# Patient Record
Sex: MALE | Race: WHITE | NOT HISPANIC OR LATINO | Employment: FULL TIME | URBAN - METROPOLITAN AREA
[De-identification: names, ages, dates, MRNs, and addresses within clinical notes are randomized per-mention and may not be internally consistent; named-entity substitution may affect disease eponyms.]

---

## 2017-01-12 ENCOUNTER — HOSPITAL ENCOUNTER (OUTPATIENT)
Dept: BEHAVIORAL HEALTH | Facility: CLINIC | Age: 52
Discharge: HOME | End: 2017-01-12
Attending: SOCIAL WORKER | Admitting: SOCIAL WORKER
Payer: COMMERCIAL

## 2017-01-12 PROCEDURE — H0001 ALCOHOL AND/OR DRUG ASSESS: HCPCS

## 2017-01-12 ASSESSMENT — PATIENT HEALTH QUESTIONNAIRE - PHQ9: 5. POOR APPETITE OR OVEREATING: NOT AT ALL

## 2017-01-12 ASSESSMENT — ANXIETY QUESTIONNAIRES
5. BEING SO RESTLESS THAT IT IS HARD TO SIT STILL: NOT AT ALL
GAD7 TOTAL SCORE: 0
1. FEELING NERVOUS, ANXIOUS, OR ON EDGE: NOT AT ALL
IF YOU CHECKED OFF ANY PROBLEMS ON THIS QUESTIONNAIRE, HOW DIFFICULT HAVE THESE PROBLEMS MADE IT FOR YOU TO DO YOUR WORK, TAKE CARE OF THINGS AT HOME, OR GET ALONG WITH OTHER PEOPLE: NOT DIFFICULT AT ALL
6. BECOMING EASILY ANNOYED OR IRRITABLE: NOT AT ALL
3. WORRYING TOO MUCH ABOUT DIFFERENT THINGS: NOT AT ALL
7. FEELING AFRAID AS IF SOMETHING AWFUL MIGHT HAPPEN: NOT AT ALL
2. NOT BEING ABLE TO STOP OR CONTROL WORRYING: NOT AT ALL

## 2017-01-12 NOTE — PROGRESS NOTES
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 past or current     Secondary meeting, it is group and lecture, continuing and feed you lunch. Yes plan on going to meetings, supposedly going through IOP here. Get a hold of the elana to see about meetings. Interested in [employer].     5. What would assist you in staying sober/straight?     education      Dimension V Ratings    Relapse/Continued Use/Continued problem potential - The placing authority must use the criteria in Dimension V to determine a client s relapse, continued use, and continued problem potential.    RISK DESCRIPTIONS - Severity rating: 3 Client has poor recognition and understanding of relapse and recidivism issues and displays moderately high vulnerability for further substance use or mental health problems. Client has few coping skills and rarely applies coping skills.    REASONS SEVERITY WAS ASSIGNED - (What information did the person provide that indicates his or her understanding of relapse issues? What about the person s experience indicates how prone he or she is to relapse? What coping skills does the person have that decrease relapse potential?)      Clt reported he was a social drinker prior to this year. Clt reported he has never tried to quit. Per Pranav collateral clt would try to have one  "but would end up having more. Clt reported one treatment in his lifetime in which he completed. Clt reported cravings from triggers and stress.  Bebot reported no attendance to meetings yet but was just discharged from [facility] 1/11/17 and plans to start attending meetings.              DIMENSION VI - Recovery Environment    1. Are you employed/attending school? Tell me about that.     Per EMR collateral assessment on 12/6/16: \"Full time-injury , right now, the plan is to go back on Monday-Friday, I can work from home if I need to or work whatever hours I need to, Expect me to work about eighty hours a week\".     Going back to work next Wednesday. Hours 7-3:30pm. In all reality 7-9pm they would like you to work but I am not going to work that. Not healthy.     2A. Describe a typical day; evening for you. Work, school, social, leisure, volunteer, spiritual practices. Include time spent obtaining, using, recovering from drugs or alcohol. (DSM)     Works full time Monday-Friday days, drinking is his leisure activity.    2B. How often do you spend more time than you planned using or use more than you planned? (DSM)     Got to daily.     3. How important is using to your social connections? Do many of your family or friends use?     When we go out we all go out drinking.     4A. Are you currently in a significant relationship?     Yes.  4B. How long? 26 years    4C. Sexual Orientation:     Heterosexual    5A. Who do you live with?      Live with spouse    5B. Tell me about their alcohol/drug use and mental health issues.     Quit drinking.      5C. Are you concerned for your safety there? No    5D. Are you concerned about the safety of anyone else who lives with you? No    6A. Do you have children who live with you?     No    6B. Do you have children who do not live with you?     Yes.  (Ask follow up questions to learn where the children are, who has custody and what the person s relationship and " responsibility is with these children and what hopes the person has for his or her future with these children.) two daughters who live on their own both adults. Rosa Maria 21 and Jeri 25    7A. Who supports you in making changes in your alcohol or drug use? What are they willing to do to support you? Who is upset or angry about you making changes in your alcohol or drug use? How big a problem is this for you?      Spouse and a friend    7B. This table is provided to record information about the person s relationships and available support It is not necessary to ask each item; only to get a comprehensive picture of their support system.  How often can you count on the following people when you need someone?    Partner / Spouse  Always supportive    Parent(s)/Aunt(s)/Uncle(s)/Grandparents  N/A    Sibling(s)/Cousin(s)  N/A    Child(will)  N/A    Other relative(s)  N/A    Friend(s)/neighbor(s)  Always supportive    Child(will) s father(s)/mother(s)  N/A    Support group member(s)  N/A    Community of jesse members  N/A    /counselor/therapist/healer  N/A    Other (specify)  N/A      8A. What is your current living situation?     Live with spouse.     8B. What is your long term plan for where you will be living?     Within the next five years, hoping to downgrade, like to a town home.     8C. Tell me about your living environment/neighborhood? Ask enough follow up questions to determine safety, criminal activity, availability of alcohol and drugs, supportive or antagonistic to the person making changes.      Liquor store one mile away    9. Criminal justice history: Gather current/recent history and any significant history related to substance use--Arrests? Convictions? Circumstances? Alcohol or drug involvement? Sentences? Still on probation or parole? Expectations of the court? Current court order? Any sex offenses - lifetime? What level? (DSM)      Per Pranav CLEMENTI and was in skilled nursing and told his  wife that he needed to come to treatment.Stated he drank a few air plane bottles of vodka on his way to work and arrested for DUI.  [facility], did it on purpose really stupid to get help. 2016 court, already had a original appearance but next one is in April.  got it switched since I went to [facility].    10. What obstacles exist to participating in treatment? (Time off work, childcare, funding, transportation, pending [facility] time, living situation)     None      Dimension VI Ratings    Recovery environment - The placing authority must use the criteria in Dimension VI to determine a client s recovery environment.    RISK DESCRIPTIONS - Severity ratin Client has passive social  or family and significant other are not interested in the client s recovery. The client is engaged in structured meaningful activity.    REASONS SEVERITY WAS ASSIGNED - (What support does the person have for making changes? What structure/stability does the person have in his or her daily life that will increase the likelihood that changes can be sustained? What problems exist in the person s environment that will jeopardize getting/staying clean and sober?)     Liberty reported he works full time and is going back on 17.  Clt reported his social connections socially drink. Clt reported he lives with his wife who quit drinking to be supportive. Clt reported he has two adult daughters who live out of the house. Clt reported spouse and a friend are supportive of him. Clt reported current legal with upcoming court appearance in April.             Client Choice/Exceptions    Would you like services specific to language, age, gender, culture, Denominational preference, race, ethnicity, sexual orientation or disability?  No    What particular treatment choices and options would you like to have? IOP    Do you have a preference for a particular treatment program? NA      Criteria for Diagnosis      Criteria for  Diagnosis  DSM-5 Criteria for Substance Use Disorder  Instructions: Determine whether the client currently meets the criteria for Substance Use Disorder using the diagnostic criteria in the DSM-V pp.481-589. Current means during the most recent 12 months outside a facility that controls access to substances      Category of Substance  Severity (ICD-10 Code / DSM 5 Code)      Alcohol Use Disorder  Severe  (10.20) (303.90)    Cannabis Use Disorder  NA    Hallucinogen Use Disorder  NA    Inhalant Use Disorder  NA    Opioid Use Disorder  NA    Sedative, Hypnotic, or Anxiolytic Use Disorder  NA    Stimulant Related Disorder  NA    Tobacco Use Disorder  NA    Other (or unknown) Substance Use Disorder  NA          Collateral Contact Summary    Number of contacts made: 2    Contact with referring person:  Yes, Pranav documentation    If court related records were reviewed, summarize here: NA    Information from collateral contacts supported/largely agreed with information from the client and associated risk ratings.        Rule 25 Assessment Summary and Plan    's Recommendation    1. Abstain from using all non-prescribed mood altering chemicals and substances. Take medication as prescribed.  2. Attend [facility] such as [facility]. Follow all recommendations made by counselor.  3. Start attending sober support group meetings.  4. Establish therapy services if recommended by treatment counselor.     Rationale: Liberty reported within the last year his use had increased and started negatively impacting multiple areas of his life such as his physical and mental health, his relationships, and legal. Liberty reported he would start drinking at home, earlier and earlier, until he admitted to his wife he needed help and went to the hospital for detox. Liberty reported he attended residential treatment program and would like to follow up with IOP treatment to continue structured support and  "routine in regards to his sobriety. These are the reasons for the above recommendations.      discussed the potential need to establish therapy services if he follows through with EOP at the [place] location to address both mental health needs and chemical use since the counselor is not dually licensed she would work with him on establishing therapy services during treatment. Clt verbalized understanding and said ok to having a discussion around the topic.          Collateral Contacts        Name:    Electronic Medical Record (EMR)    Relationship:    Past cd evaluation done on 12/6/16    Phone Number:    NA Releases:    Yes      See throughout assessment documentation. When it states \"per collateral assessment or per EMR collateral assessment or per EMR\". Per EMR collateral assessment from 12/6/16 from wife: \" He used to be a social drinker for 30 years, then he hit a rough patch beginning of March of this year, and was on disability at home from approx. March to August. He started drinking during the day secretly. He is going to lose his job at [employer] because of layoffs and that has been looming for the past few years. He has been at [employer] for 20 years and is anxious about the future. Drinks alcohol not sure-I only saw him drink on Friday and Saturday nights and it was socially. He has had withdrawal of sweating, vomiting, shaking and he went into the hospital and got detox this past week. I am worried about the future for him. He needs to get back to work on a regular basis. He can't sit home during the day and feel sorry for himself. He has no self esteem when it comes to finding a new job he is stuck, scared, and doesn't know where to turn. He used to work out everyday. Examples, ran a half marathon, biked a 150 miles in a weekend and did a triathlon. He has completely quit working out and needs to begin again\".          Collateral Contacts        Name:    Scarlett Rock " "Blaine Relationship:    Inpatient treatment from 12/20/16-1/11/17    Phone Number:    [phone] Releases:    Yes        Received initial assessment from Pranav when the client was admitted to the program. See throughout documentation when states \"per Pranav collateral\".   ollateral Contacts      A problematic pattern of alcohol/drug use leading to clinically significant impairment or distress, as manifested by at least two of the following, occurring within a 12-month period:    Alcohol/drug is often taken in larger amounts or over a longer period than was intended.  There is a persistent desire or unsuccessful efforts to cut down or control alcohol/drug use  A great deal of time is spent in activities necessary to obtain alcohol, use alcohol, or recover from its effects.  Craving, or a strong desire or urge to use alcohol/drug  Continued alcohol use despite having persistent or recurrent social or interpersonal problems caused or exacerbated by the effects of alcohol/drug.  Alcohol/drug use is continued despite knowledge of having a persistent or recurrent physical or psychological problem that is likely to have been caused or exacerbated by alcohol.  Tolerance, as defined by either of the following: A need for markedly increased amounts of alcohol/drug to achieve intoxication or desired effect. and A markedly diminished effect with continued use of the same amount of alcohol/drug.  Withdrawal, as manifested by either of the following: The characteristic withdrawal syndrome for alcohol/drug (refer to Criteria A and B of the criteria set for alcohol/drug withdrawal). and Alcohol/drug (or a closely related substance, such as a benzodiazepine) is taken to relieve or avoid withdrawal symptoms.      Specify if: In early remission:  After full criteria for alcohol/drug use disorder were previously met, none of the criteria for alcohol/drug use disorder have been met for at least 3 months but for less than 12 " months (with the exception that Criterion A4,  Craving or a strong desire or urge to use alcohol/drug  may be met).      In sustained remission:    After full criteria for alcohol use disorder were previously met, non of the criteria for alcohol/drug use disorder have been met at any time during a period of 12 months or longer (with the exception that Criterion A4,  Craving or strong desire or urge to use alcohol/drug  may be met).    Specify if:    This additional specifier is used if the individual is in an environment where access to alcohol is restricted.    Mild: Presence of 2-3 symptoms    Moderate: Presence of 4-5 symptoms    Severe: Presence of 6 or more symptoms

## 2017-01-12 NOTE — PROGRESS NOTES
[address]  [place] [address]                 ADULT CD ASSESSMENT       Additional Clinical Questions - Outpatient    Patient Name:            Dariusz Galeana  Cell Phone:              Home: [phone] (home)                Mobile:  No relevant phone numbers on file.        Email:             [email or web address]  Emergency Contact: Jagruti Galeana                            Tel: [phone]    ________________________________________________________________________      The patient is      With which race do you identify? White    Please list your family members and if they are living or , i.e. (grandparents, parents, step-parents, adoptive parents, number of siblings, half-siblings, etc.)       Mother      Father  Living    1 Step-mother    Living  No Step-father  NA    Maternal Grandmother      Fraternal Grandmother      Maternal Grandfather       Fraternal Grandfather      No Sister(s)  NA  1 Brother(s)    Living    No Half-sister(s)    NA  No Half-brother(s)  NA                  Who raised you? (parents, grandparents, adoptive parents, step-parents, etc.)    Both Parents    Have any of your family members or significant others had problems with mental illness or substance abuse?  Please explain.    Yes brother, alcohol    Do you have any children or Stepchildren? Yes, please explain: Jeri 25 and Rosa Maria 21    Are you being investigated by Child Protection Services? No    Do you have a child protection worker, probation office or ? No    How would you describe your current finances?  Doing okay    If you are having problems, (unpaid bills, bankruptcy, IRS problems) please explain:  No    If working or a student are you able to function appropriately in that setting? Yes, returning to work on 17-we will see how it goes.     Describe your preferred learning style:  by  hands-on practice, by watching someone else demonstrate.    What personal strengths do you have that can help you get sober?  The tools I learned at [facility]. 12 Step and Big Book    Do you currently self-administer your medications?  Yes    Have you ever:      Had to lie to people important to you about how much you narayan?      No      Felt the need to bet more and more money?       No      Attempted treatment for a gambling problem?          No      Touched or fondled someone else inappropriately, or forced them to have sex with you against their will?         No      Are you or have you ever been a registered sex offender?          No      Is there any history of sexual abuse in your family?          No      [place] obsessed by your sexual behavior (having sex with many partners, masturbating often, using pornography often?          No      Received therapy or stayed in the hospital for mental health problems?          No      Hurt yourself (cutting, burning or hitting yourself)?          No      Purged, binged or restricted yourself as a way to control your weight?        No        Are you on a special diet?        No        Do you have any concerns regarding your nutritional status?         No        Have you had any appetite changes in the last 3 months?         Yes, started since sober.       Have you had any weight loss or weight gain in the last 3 months?  If yes, how much gain or loss: declined to answer.    If weight patient gains more than 10 lbs or loses more than 10 lbs, refer to program RN /  Attending Physician for assessment.     Gain, from eating 3x per day.      Was the patient informed of BMI?      Normal, No Intervention    No      Do you have any dental problems?          No      Lived through any trauma or stressful events?          Yes, If yes explain: lost brother, and several others, seen people commit suicide, work is overly stressful.      In the past month, have you had any of the  following symptoms related to the trauma listed above? (Dreams, intense memories, flashbacks, physical reactions, etc.)           No      Believed that people are spying on you, or that someone was plotting against you or trying to hurt you?        No      Believed that someone was reading your mind or could hear your thoughts or that you could actually read someone's mind, hear what another person was thinking?         No      Believed that someone or some force outside of yourself put thoughts in your mind that were not your own, or made you act in a way that was not your usual self?  Or have you ever thought you were possessed?           No      Believed that you were being sent special messages through the TV, radio or newspaper?           No      [place] things other people couldn't hear, such as voices?           No      Had visions when you were awake?  Or have you ever seen things other people couldn't see?         No          Suicide Screening Questions:      1. Are you feeling hopeless about the present/future?    No   2. Have you ever had thoughts about taking your life?    No    3. When did you have these thoughts?  NA    4. Do you have any current intent or active desire to take your life?    No    5. Do you have a plan to take your life?     No    6. Have you ever made a suicide attempt?    No    7. Do you have access to pills, guns or other methods to kill yourself?    No         Risk Status - Use as Guide/Example      Ideation - Active  Thoughts of suicide  Intent to follow  Through on suicide  Plan for completing  suicide     Yes  No  Yes  No  Yes  No    Emergent  X    X    X      Urgent / Non-Emergent  X    X      X    Non-Urgent  X      X    X    No Current / Active Risk (Past 6 Months)    X    X    X    Dariusz Galeana  No  No  No          Additional Risk Factors:  Significant history of having untreated or poorly treated mental health symptoms  A recent death of someone close to the patient and/or  "unresolved grief and loss issues  A triggering event(s) leading to humiliation, shame or despair    Protective Factors:   Having people in the his/her life who would prevent the patient from considering comminting suicide (i.e. young children, spouse, parents, etc.)  Having easy access to supportive family members  Having a good community support network        Risk Status:    Emergent? No  Urgent / Non-Emergent?  No  Present / Non- Urgent? No      No Current Risk? Yes, Evaluation Counselors - Document in Epic / SBAR to counselor \"No identified risk\" and Treatment Counselors - Assess weekly in progress notes under Dimension 3 and summarize in Discharge / Treatment summary under Dimension 3.    Additional information to support suicide risk rating: See Above    Mental Status Assessment    Physical Appearance/Attire:  Appears stated age and Neat  Hygiene:  well groomed  Eye Contact:  at examiner  Speech:  regular  Speech Volume:  regular  Speech Quality: fluid  Cognitive/Perceptual:  reality based  Cognition:  memory intact    Judgment:  able to concentrate  Insight:  able to concentrate  Orientation:  time, place, person and situation  Thought:  concrete  Hallucinations:  none  General Behavioral Tone:  cooperative  Psychomotor Activity:  no problem noted  Gait:  no problem  Mood:  appropriate  Affect:  congruence/appropriate    Counselor Notes: NA    Criteria for Diagnosis  DSM-5 Criteria for Substance Abuse    303.90 (F10.20) Alcohol Use Disorder Severe    LEVEL OF CARE     Intoxication and Withdrawal: 0  Biomedical:  1  Emotional and Behavioral:  1  Readiness to Change:  0  Relapse Potential: 3  Recovery Environmental:  1    Initial problem list:    The patient lacks relapse prevention skills  The patient has poor coping skills  The patient lacks a sober peer support network  The patient has dual issues of MI and CD  The patient has a significant history of trauma and/or abuse issues  The patient has a significant " history of grief and loss issues    Patient/Client is willing to follow treatment recommendations.  Yes    Reina [facility]           Vulnerable Adult Checklist for OUTPATIENTS      1.  Do you have a physical, emotional or mental infirmity or dysfunction?       No    2.  Does this issue impair your ability to provide for your own care without help, including providing yourself with food, shelter, clothing, healthcare or supervision?       No    3.  Because of this issue, I need assistance to protect myself from maltreatment by others.      No    Based on the above information:    This person is not a functional Vulnerable Adult according to [place] Statute 626.5572 subdivision 21.

## 2017-01-13 ASSESSMENT — ANXIETY QUESTIONNAIRES: GAD7 TOTAL SCORE: 0

## 2017-01-13 ASSESSMENT — PATIENT HEALTH QUESTIONNAIRE - PHQ9: SUM OF ALL RESPONSES TO PHQ QUESTIONS 1-9: 2

## 2017-01-18 ENCOUNTER — BEH TREATMENT PLAN (OUTPATIENT)
Dept: BEHAVIORAL HEALTH | Facility: CLINIC | Age: 52
End: 2017-01-18

## 2017-01-18 ENCOUNTER — HOSPITAL ENCOUNTER (OUTPATIENT)
Dept: BEHAVIORAL HEALTH | Facility: CLINIC | Age: 52
End: 2017-01-18
Attending: SOCIAL WORKER
Payer: COMMERCIAL

## 2017-01-18 PROBLEM — F19.20 CHEMICAL DEPENDENCY (H): Status: ACTIVE | Noted: 2017-01-18

## 2017-01-18 PROCEDURE — H2035 A/D TX PROGRAM, PER HOUR: HCPCS | Mod: HQ

## 2017-01-19 NOTE — PROGRESS NOTES
Outcome of vulnerable Adult Assessment for Outpatients:    1.  Do you have a physical, emotional or mental infirmity or dysfunction?       No    2.  Does this issue impair your ability to provide for your own care without help, including providing yourself with food, shelter, clothing, healthcare or supervision?       No      3.  Because of this issue, I need assistance to protect myself from maltreatment by others.      No    Based on the above information:    This person is not a functional Vulnerable Adult according to [place] Statute 626.5572 subdivision 21.

## 2017-01-19 NOTE — PROGRESS NOTES
"D:   Dariusz Galeana attended orientation on Wed, 1/18/2017, with this counselor. He was given the orientation packet which was reviewed in its entirety. This packet includes: Program philosophy, treatment goals, program schedules, educational components, family group sessions, how to use the treatment materials, program rules and policies, client rights/responsibilities, information on HIV, STDs, Hepatitis, TB, information on how substance use affects pregnancy, information on narcan, abuse prevention plan/reporting protocol, client grievance procedure, risks of treatment, confidentiality and exceptions, treatment agreement/consent, facility tour/safety information and information about co-occurring disorders. Client was also given information on insurance processing and the contact number for the business office should he have any questions about coverage and/or co-pays. He was introduced to the stages of change and identified himself at the preparation stage as he reports, \"I am working on continued care\". Client completed his goals for treatment to include in the treatment plan and learned about PAWS. Client reported last use date of alcohol was as 12/19/2016. He reviewed the risks of treatment and is aware that he may experience emotions and/or memories that may be uncomfortable. He agreed to the confidential nature of the treatment group and is open to the group process. We discussed why building a sober support network is important for sobriety.     I: Required paperwork was explained and signed, gave him tour of facility, showed him the group room, fire exits and location of restrooms, he collaborated on the ISP and the Vulnerable Adult assessment. We discussed personal goals for treatment and what to expect within a group therapy format. He completed his personal goals for treatment to review with his counselor at his treatment plan session scheduled for 5:30 pm. on Monday, 1/23/2017.    A: Client " acknowledged understanding of orientation material. He was alert and appeared motivated for sobriety and ready to start treatment.      P: Client will review all the packet information at home in case he has additional questions and then start IOP CD treatment at [facility] location at  6:30 pm. on Monday, 1/23/2017.      WOODROW Shea

## 2017-01-19 NOTE — PROGRESS NOTES
Initial Services Plan        Before your first treatment group, please do the following    Immediate health & safety concerns:  Look for a support network (such as AA, NA, DBT group, a Gnosticist group, etc.)    Suggestions for client during the time between intake & completion of treatment plan:  Tour your treatment center (unit or outpatient clinic).  Introduce yourself to the treatment group.  Spend time getting to know your peers.  Review your patient or client handbook.    Client issues to be addressed in the first treatment sessions:  Talk to your family and friends about the family program.  Other: client will discuss his first night in group his continued sobriety and ongoing care.      WOODROW Shea  1/18/2017  7:47 PM

## 2017-01-23 ENCOUNTER — HOSPITAL ENCOUNTER (OUTPATIENT)
Dept: BEHAVIORAL HEALTH | Facility: CLINIC | Age: 52
End: 2017-01-23
Attending: SOCIAL WORKER
Payer: COMMERCIAL

## 2017-01-23 PROCEDURE — H2035 A/D TX PROGRAM, PER HOUR: HCPCS

## 2017-01-23 NOTE — PROGRESS NOTES
"Patient Safety Plan Template    Name:   Dariusz Galeana YOB: 1965 Age:  51 year old MR Number:  [id number]   Step 1: Warning signs (Thoughts, images, mood, situation, behavior) that a crisis may be developin. Thoughts about using     2. Stress (specifically from work)     3. NA     Step 2: Internal coping strategies - Things I can do to take my mind off of my problems without contacting another person (relaxation technique, physical activity):     1. Breathing techniques     2. Change thought pattern     3. Using the serenity prayer     Step 3: People and social settings that provide distraction:     1. Name: Finley (when at work)   Phone: NA   2. Name: Darren Downing (sponsor)   Phone:    3. Place: Visit wife at work   4. Place: AA meeting     The one thing that is most important to me and worth living for is: \"staying sober for myself so I can keep my wife and kids\"     Step 4: People whom I can ask for help:     1. Name: Jad Pittman   Phone:      2. Name: Jagruti   Phone:      3. Name: Jad Walker   Phone:      Step 5: Professionals or agencies I can contact during a crisis:     1. Clinician Name: Prema Antoine   Phone: [phone]   Clinician Pager or Emergency Contact #: NA     2. Clinician Name: KAREN   Phone: KAREN     Clinician Pager or Emergency Contact #: NA     3. Local Urgent Care Services: KAREN    Urgent Care Services Address:     Urgent Care Services Phone: NA     4. Suicide Prevention Lifeline Phone: 1-340-730-RQPQ ([phone])     Step 6: Making the environment safe:     1. NA     2. NA     Safety Plan Template 2008 Radha Barr and Chilango Galeas is reprinted with the express permission of the authors.  No portion of the Safety Plan Template may be reproduced without the express, written permission.  You can contact the authors at [email or web address] or [email or web address].               "

## 2017-01-23 NOTE — PROGRESS NOTES
Comprehensive Assessment Summary     Based on client interview, review of previous assessments and   comprehensive assessment interview the following diagnosis and recommendations are:     Patient: Dariusz Galeana  MRN; [id number]   : 1965  Age: 51 year old Sex: male       Client meets criteria for:  303.90 (F10.20) Alcohol Use Disorder Severe    Dimension One: Acute Intoxication/Withdrawal Potential     Ratin  (Consider the client's ability to cope with withdrawal symptoms and current state of intoxication)   Client reports last date of substance use was 16.  He completed inpatient treatment.  No current withdrawal concerns.    Dimension Two: Biomedical Condition and Complications    Ratin  (Consider the degree to which any physical disorder would interfere with treatment for substance abuse, and the client's ability to tolerate any related discomfort; determine the impact of continued chemical use on the unborn child if the client is pregnant)   Client reports history of GERD and hypertension.  These are managed with medications.  He has a primary doctor.    Dimension Three: Emotional/Behavioral/Cognitive Conditions & Complications  Ratin  (Determine the degree to which any condition or complications are likely to interfere with treatment for substance abuse or with functioning in significant life areas and the likelihood of risk of harm to self or others)   Client has past diagnoses of PTSD, depression and anxiety.  Client reports that his alcohol use exacerbated these symptoms.  He also attributes increased symptoms due to work stress.  Client completed depression and anxiety screening and his scores indicated minimal to no symptoms.      Dimension Four: Treatment Acceptance/Resistance     Ratin  (Consider the amount of support and encouragement necessary to keep the client involved in treatment)   Client completed residential treatment and is following through with aftercare.   He has internal motivation for sobriety.    Dimension Five: Continued Use/Relaspe Prevention     Rating:  3  (Consider the degree to which the client's recognizes relapse issues and has the skills to prevent relapse of either substance use or mental health problems)   This is client's first serious attempt at sobriety and has never attempted to quit drinking prior to treatment admission.  Client acknowledges that stress is a big trigger for him.    Dimension Six: Recovery Environment     Ratin  (Consider the degree to which key areas of the client's life are supportive of or antagonistic to treatment participation and recovery)   Client is employed full time and often times is expected to work 80 hours a week.  He attributes a lot of his stress to work and is interested in seeking different employment.  Client lives with his spouse who is supportive and has also quit drinking.  Client has pending legal issues due to a DUI.    I have reviewed the information on the assessment, psychosocial and medical history and checklist:        it is current

## 2017-01-23 NOTE — PROGRESS NOTES
[facility]  Adult Chemical Dependency Program  Treatment Plan Requirements    These services are provided by the facility for each patient/client according to the individual's treatment plan:    Individual and group counseling    Education    Transition services    Services to address any co-occurring mental illness    Service coordination    Initial Treatment Plan Goals:  1. Complete all the requirements of Program Orientation.  2. Maintain medication compliance throughout the program.  3. Complete requirements for workshop/skills groups based on identified issues on your problem list.  4. Complete the support group attendance feedback sheet weekly.  5. Gain family involvement in treatment process to address family issues from the problem list.  6. Attend and participate in all required groups per individual treatment plan.  7. Focus attention to individualized issues from the treatment plan.  8. Complete all requirements for UA's, alcohol screening tests and other testing.  9. Schedule a physical examination if recommended.    In addition to the above, complete all individual goals as specifically outlines on your treatment plan.    Criteria for discharge:  Patients/clients are discharged from the program following completion of the entire program including Phase I and II or acceptance of other post-treatment referrals such as [facility] house, or aftercare at other facilities.  Patients/clients may also be discharged for inappropriate behavior or chemical use.      Favorable Discharge - Patients/clients have completed agreed upon treatment goals, understand their diagnosis and appear motivated about the follow-up care.    Guarded Discharge - Patients/clients have demonstrated some understanding of their diagnosis and recovery process, and have completed some of their treatment goals.  This prognosis also includes patients/clients who have completed some treatment goals but have not made  commitment to community support or follow through with referrals.    Unfavorable Discharge - Patients/clients have not completed agreed upon treatment goals due to their own choice, have limited understanding of their diagnosis, and have shown minimal or inconsistent behavior conducive to recovery.  Those patients/clients discharged due to behavioral problems will also be unfavorable discharges.                Adult CD Treatment Plan                                Dariusz Galeana   [id number]   1965 51 year old male      Acute Intoxication/Withdrawal Potential     DIMENSION 1  RISK FACTOR: 0     SUBSTANCE USE DISORDERS:  Alcohol            Date Assigned Source Area of Treatment Focus / Goal / Treatment Strategies    Target  Date Initials Outcome Date Completed   1/23/17  Self -  Current and Assessment -  Current  Area of Treatment Focus:   Substance use, cravings and urges.  Last use date was reported as 12/9/16.       Goal:   Develop effective strategies to maintain sobriety.      Treatment Strategies:   Report to counselor and group any alcohol or drug use. 5/30/17 MD  client left and opted out of treatment 03/02/2017        Biomedical Conditions and Complaints     DIMENSION 2  RISK FACTOR: 1             Date Assigned Source Area of Treatment Focus / Goal / Treatment Strategies Target  Date Initials Outcome Date Completed   1/23/17  Self -  Current and Assessment -  Current  Area of Treatment Focus:  Client/Patient has a medical diagnosis of GERD and hypertension.    Goal:   Follow recommendations of medical provider.    Treatment Strategies:  D Report change in severity of symptoms to staff.  and Continue to take prescribed medications and follow-up with medical interventions while in program. 5/30/17 MD          Emotional/Behavioral/Cognitive Conditions and Complications     DIMENSION 3  RISK FACTOR: 1             Date Assigned Source Area of Treatment Focus / Goal / Treatment Strategies Target  Date  "Initials Outcome Date Completed     1/23/17  Self -  Current  Area of Treatment Focus:   Reports feelings of stress.       Goal:   Find a balance and take time for self care.    Treatment Strategies:    Not bringing work home and scheduling time for self and leisure within the week. 5/30/17 MD           Readiness to Change     DIMENSION 4  RISK FACTOR: 0             Date Assigned Source Area of Treatment Focus / Goal / Treatment Strategies Target  Date Initials Outcome Date Completed   1/23/17  Self -  Current  Area of Treatment Focus:  {Internal motivation    Goal:   Ensure continued motivation    Treatment Strategies:   Attend weekly support group meetings 5/30/17 MD           Relapse/Continues Use/Continues Problem Potential     DIMENSION 5  RISK FACTOR: 3                 Date Assigned Source Area of Treatment Focus / Goal / Treatment Strategies Target  Date Initials Outcome Date Completed   1/23/17  Self -  Current and Assessment -  Current  Area of Treatment Focus:   Triggers and relapse warning signs    Goal:   Identify personal triggers and relapse warning signs. and how you will address them.    Treatment Strategies:   Complete the triggers and cravings assignment and include alternative behaviors. 2/15/17 MD          Recovery Environment     DIMENSION 6  RISK FACTOR: 1                 Date Assigned Source Area of Treatment Focus / Goal / Treatment Strategies Target  Date Initials Outcome Date Completed   1/23/17  Self -  Current  Area of Treatment Focus:   \"seeking different employment\"    Goal:   Secure new employment sometime after February    Treatment Strategies:   Sending out resumes weekly and continue to network  5/30/17 MD        Individual abuse prevention plan (required for lodging plus) : specific actions, referral:   No additional protection measures required other than the Program Abuse Prevention Plan - No        All interventions that are designated as current will need to be completed in " order to transition out of treatment with a favorable prognosis. The treatment plan is a flexible document and a work in progress. Interventions and goals may be added at any time to customize this plan to each individual's needs. Client may work with clinician to change interventions as long as they pertain to the goals stipulated in the plan and/or are clinically driven.

## 2017-01-24 NOTE — PROGRESS NOTES
Acknowledgement of Current Treatment Plan       I have reviewed my treatment plan with my therapist / counselor on 1/23/17. I agree with the plan as it is written in the electronic health record.    Name Signature   Dariusz Galeana    Name of Therapist / Counselor    Jocelyn Kehr Sparby, LADC

## 2017-02-13 ENCOUNTER — HOSPITAL ENCOUNTER (OUTPATIENT)
Dept: BEHAVIORAL HEALTH | Facility: CLINIC | Age: 52
End: 2017-02-13
Attending: SOCIAL WORKER
Payer: COMMERCIAL

## 2017-02-13 PROCEDURE — H2035 A/D TX PROGRAM, PER HOUR: HCPCS | Mod: HQ

## 2017-02-15 ENCOUNTER — HOSPITAL ENCOUNTER (OUTPATIENT)
Dept: BEHAVIORAL HEALTH | Facility: CLINIC | Age: 52
End: 2017-02-15
Attending: SOCIAL WORKER
Payer: COMMERCIAL

## 2017-02-15 PROCEDURE — H2035 A/D TX PROGRAM, PER HOUR: HCPCS | Mod: HQ

## 2017-02-20 ENCOUNTER — HOSPITAL ENCOUNTER (OUTPATIENT)
Dept: BEHAVIORAL HEALTH | Facility: CLINIC | Age: 52
End: 2017-02-20
Attending: SOCIAL WORKER
Payer: COMMERCIAL

## 2017-02-20 PROCEDURE — H2035 A/D TX PROGRAM, PER HOUR: HCPCS | Mod: HQ

## 2017-02-22 ENCOUNTER — HOSPITAL ENCOUNTER (OUTPATIENT)
Dept: BEHAVIORAL HEALTH | Facility: CLINIC | Age: 52
End: 2017-02-22
Attending: SOCIAL WORKER
Payer: COMMERCIAL

## 2017-02-22 PROCEDURE — H2035 A/D TX PROGRAM, PER HOUR: HCPCS | Mod: HQ

## 2017-02-23 ENCOUNTER — HOSPITAL ENCOUNTER (OUTPATIENT)
Dept: BEHAVIORAL HEALTH | Facility: CLINIC | Age: 52
End: 2017-02-23
Attending: SOCIAL WORKER
Payer: COMMERCIAL

## 2017-02-23 PROCEDURE — H2035 A/D TX PROGRAM, PER HOUR: HCPCS | Mod: HQ

## 2017-02-23 NOTE — PROGRESS NOTES
CD ADULT Progress Note     Treatment Plan Review completed on:  2/20/17    Attendance Dates: 2/20/2017, 2/22/2017, 2/23/2017    Total # of Group Sessions:  6     MONDAY TUESDAY WEDNESDAY THURSDAY FRIDAY SATURDAY SUNDAY Total   Group Therapy 2 hours  2 hours 2 hours       Specialty Groups*           1:1           Family Program           [place]             Phase II             Absent           Total             *Specialty Groups include Mental Health Care, Assertiveness and Communication, Sobriety Maintenance Skills, Spiritual Care, Stress Management, Relapse Prevention, Family Systems.                    Learning Style:  Verbal    Staff member contributing:  WOODROW Shea    Received supervision:  No    Client:  contributed to goals and plan    Did Client receive a copy of treatment plan/revised plan:  Yes    Changes to Treatment Plan:  No    Client agrees with plan/revised plan:  Yes    Any changes in Vulnerable Adult Status:  No    Substance Use Disorders:  Alcohol Use Disorder Severe (F10.20)      [facility] Risk Ratings and Data       DIMENSION 1: Acute Intoxication/Withdrawal  The client's ability to cope with withdrawal symptoms and current state of intoxication       Acute Intoxication/Withdrawal - Current Risk Factor:  0    Reporting sober date of 12/9/17    Goals:  Develop effective strategies to maintain sobriety.     Data:  Client denies any recent substance use.  No signs or withdrawal or intoxication present.      DIMENSION 2:  Biomedical Conditions and Complaints  The degree to which any physical disorder would interfere with treatment for substance abuse and the client's ability to tolerate any related discomfort     Biomedical Conditions and Complaints - Current Risk Factor:  1    Goals: Follow recommendations of medical provider.    Data:  Client denies any current medical concerns.  He reports a 10 out of 10(10=highest) on how well he is taking care of himself physically.      DIMENSION 3:   Emotional/Behavioral/Cognitive Conditions and Complications  The degree to which any condition or complications are likely to interfere with treatment for substance abuse or with function in significant life areas and the likelihood of risk of harm to self or others.     Emotional/Behavioral - Current Risk Factor:  1    DSM-5 Diagnoses:   Reported by client issues with stress, specifically from work     Suicide Assessment:  Risk Status    Ideation - Active thoughts of suicide Intent to follow through on suicide Plan for completing suicide    Yes No Yes No Yes No   Emergent         Urgent / Non-Emergent         Non- Urgent         No Current/Active Risk   x  x  x     Goals: Find a balance and take time for self care    Data:  Client denies any current mental health or emotional concerns.        DIMENSION 4:  Readiness to Change  Consider the amount of support and encouragement necessary to keep the client involved in treatment.     Readiness to Change - Current Risk Factor:  0    Goals:  Ensure continued motivation    Data:  Client rates his commitment to sobriety at a 10 out of 10 (10=highest).  He appears genuinely motivated.       DIMENSION 5:  Relapse/Continued Use/Continued Problem Potential  Consider the degree to which the client recognizes relapse issues and has the skills to prevent relapse of either substance use or mental health problems.     Relapse/Continued Use/Continued Problem Potential - Current Risk Factor:  3    Goals:  Identify personal triggers and relapse warning signs, and how you will address them.    Data:  Jeff reports 0 episodes of cravings. Client reports the intensity of cravings were 0 on a 0-10 scale with 10 being the highest. Client reports 10 on a 0-10 with 10 being the highest scale of commitment to sobriety. Client was encouraged to continue to recognize how the healthy plans for sobriety can interfere with thoughts of using, cravings, and triggers and will stop feeding in to the  thoughts of addiction.        DIMENSION 6:  Recovery Environment  Consider the degree to which key areas of the client's life are supportive of or antagonistic to treatment participation and recovery.     Recovery Environment - Current Risk Factor:  1    Support group attended this week:  Yes 4x    Did family agree to attend family week:  n/a    If yes:  none schedule this week    Goals:  Secure new employment sometime after February.    Data:  Client has a supportive wife.  He reports using 3 c's, not stressing over work and breathing.         Intervention:  NA speakers came and discussed the benefits of NA on their recovery/Spiritual Care     Assessment:  Stages of Change Model  Preparation/Determination    Preparation/Determination  Client appears in preparation/determination stage as the client is increasing commitments, anticipate obstacles and problems, encouraged to increase using support systems. Client is making positive changes in every day life as the client intends to make changes of strengthening and reinforcing commitments to change by practicing healthy changes to improve physical health and mood.     Plan:  Attend support group meetings  Continue to exercise regularly

## 2017-03-01 ENCOUNTER — HOSPITAL ENCOUNTER (OUTPATIENT)
Dept: BEHAVIORAL HEALTH | Facility: CLINIC | Age: 52
End: 2017-03-01
Attending: SOCIAL WORKER
Payer: COMMERCIAL

## 2017-03-03 NOTE — PROGRESS NOTES
CHEMICAL DISCHARGE SUMMARY:      REFERRAL SOURCE:  Self.   PROGRAM:  Intensive Evening Outpatient Chemical Dependency Treatment at [facility] in [place].   ADMISSION DATE:  01/18/2017   EVALUATION COUNSELOR:  Reina Valdez [facility]   ADMISSION DIAGNOSIS:  F10.20.   :  INDIGO Newell, [facility]   DISCHARGE DATE:  03/02/2017    LAST SESSION DATE:  02/23/2017   DISCHARGE DIAGNOSIS:  See admitting diagnosis.   HOURS OF TREATMENT COMPLETED:  11   DISCHARGE STATUS:  Without staff approval.   PROGNOSIS:  Unfavorable.      PRESENTING INFORMATION:  See diagnostic summary for complete information.  Mr. Dariusz Galeana presented as a 51-year-old male.  Client sought chemical dependency treatment due to continued care for issues due to alcohol use.      SERVICES RENDERED:  Diagnostic session and counselor-facilitated group therapy as well as weekly educational workshops.  Services also include relapse behavior and prevention, lifestyle skills training and aftercare planning.      ISSUES ADDRESSED IN TREATMENT:   DIMENSION 1/ACUTE WITHDRAWAL ISSUES/DETOX:  Mr. Galeana reports last use as 12/09/2016.  Risk rating at admission 1.  Risk rating at discharge 0.      DIMENSION 2/BIOMEDICAL CONDITIONS:  Mr. Galeana reports health issues with a history of GERD and hypertension.  Risk rating at admission 1.  Risk rating at discharge 1.        DIMENSION 3/EMOTIONAL AND BEHAVIORAL:  Mr. Galeana reports a past diagnosis of PTSD, depression and anxiety.  Client reports that his alcohol exacerbated these symptoms.  Risk rating at admission 1.  Risk rating at discharge 1.      DIMENSION 4/READINESS TO CHANGE:  Mr. Galeana entered treatment due to continuing care due  recommendation after completing an inpatient program.  He was in precontemplation as he was beginning to think of how to make changes in his life conductive to his recovery.  He identified himself as chemically dependent and verbally accepted  responsibility for his using behavior.  He had verbalized a desire to change, but requested to be excused at the beginning of his treatment to go to [place] for his daughter's wedding.  It appears he is unwilling to make commitments to consistent attendance for treatment. Client called on 03/02/2017 to opt out of treatment and does not feel like he is getting any benefits.  Client had attended a total of 6 sessions and missed a total of 12 sessions of treatment, 3 of which were unexcused.  Risk rating at admission 0.  Risk rating at discharge 1.      DIMENSION 5/RELAPSE, CONTINUED USE, CONTINUED PROBLEM POTENTIAL:  Mr. Galeana lacks relapse prevention techniques.  Risk rating at admission 3.  Risk rating at discharge 3.  Mr. Galeana lacks sober support network and only recently began attending meetings.  Risk rating at admission 1.  Risk rating at discharge 1.  Strengths include client verbalized a desire to maintain his sobriety to the counselor when he was in treatment. Client would benefit from continued support in this area as he builds his sober network and and begins to practice coping skills.      DIMENSION 6/RECOVERY:  Mr. Galeana lacks sober support.  Risk rating at admission 1.  Risk rating at discharge 1.    Strengths include client verbalized a desire to maintain his sobriety to the counselor when he was in treatment.  Client called on 03/02/2017 to opt out of treatment and does not feel like he is getting any benefits.     PROGNOSIS:  Poor due to absences and canceling treatment.      LIVING ARRANGEMENTS:  Independent living with spouse.      CONTINUING CARE RECOMMENDATIONS AND REFERRALS:  It is recommended that this client complete treatment and abstain from alcohol and all mood mood-altering chemicals and obtain sober supportive friends and obtain a sponsor.         This information has been disclosed to you from records protected by Federal confidentiality rules (42 CFR part 2). The Federal rules  prohibit you from making any further disclosure of this information unless further disclosure is expressly permitted by the written consent of the person to whom it pertains or as otherwise permitted by 42 CFR part 2. A general authorization for the release of medical or other information is NOT sufficient for this purpose. The Federal rules restrict any use of the information to criminally investigate or prosecute any alcohol or drug abuse patient.      INDIGO WALKER, [facility]             D: 2017 18:19   T: 2017 21:42   MT: LQ      Name:     MELLY LEI   MRN:      [id number]        Account:      [id number]   :      1965           Visit Date:   2017      Document: [id number]

## 2018-02-19 ENCOUNTER — RECORDS - HEALTHEAST (OUTPATIENT)
Dept: LAB | Facility: CLINIC | Age: 53
End: 2018-02-19

## 2018-02-19 LAB
ALBUMIN SERPL-MCNC: 3.8 G/DL (ref 3.5–5)
ALP SERPL-CCNC: 80 U/L (ref 45–120)
ALT SERPL W P-5'-P-CCNC: 16 U/L (ref 0–45)
ANION GAP SERPL CALCULATED.3IONS-SCNC: 11 MMOL/L (ref 5–18)
AST SERPL W P-5'-P-CCNC: 14 U/L (ref 0–40)
BILIRUB SERPL-MCNC: 0.6 MG/DL (ref 0–1)
BUN SERPL-MCNC: 19 MG/DL (ref 8–22)
CALCIUM SERPL-MCNC: 9.3 MG/DL (ref 8.5–10.5)
CHLORIDE BLD-SCNC: 106 MMOL/L (ref 98–107)
CHOLEST SERPL-MCNC: 187 MG/DL
CO2 SERPL-SCNC: 25 MMOL/L (ref 22–31)
CREAT SERPL-MCNC: 0.77 MG/DL (ref 0.7–1.3)
FASTING STATUS PATIENT QL REPORTED: NORMAL
GFR SERPL CREATININE-BSD FRML MDRD: >60 ML/MIN/1.73M2
GLUCOSE BLD-MCNC: 100 MG/DL (ref 70–125)
HDLC SERPL-MCNC: 44 MG/DL
LDLC SERPL CALC-MCNC: 115 MG/DL
POTASSIUM BLD-SCNC: 4.6 MMOL/L (ref 3.5–5)
PROT SERPL-MCNC: 6.6 G/DL (ref 6–8)
PSA SERPL-MCNC: 1 NG/ML (ref 0–3.5)
SODIUM SERPL-SCNC: 142 MMOL/L (ref 136–145)
TRIGL SERPL-MCNC: 140 MG/DL
TSH SERPL DL<=0.005 MIU/L-ACNC: 1.28 UIU/ML (ref 0.3–5)

## 2020-09-08 ENCOUNTER — HOSPITAL ENCOUNTER (EMERGENCY)
Facility: CLINIC | Age: 55
Discharge: HOME | End: 2020-09-08
Attending: FAMILY MEDICINE | Admitting: FAMILY MEDICINE
Payer: COMMERCIAL

## 2020-09-08 VITALS
DIASTOLIC BLOOD PRESSURE: 102 MMHG | HEART RATE: 67 BPM | SYSTOLIC BLOOD PRESSURE: 153 MMHG | BODY MASS INDEX: 29.16 KG/M2 | RESPIRATION RATE: 18 BRPM | WEIGHT: 175 LBS | OXYGEN SATURATION: 98 % | HEIGHT: 65 IN | TEMPERATURE: 98.4 F

## 2020-09-08 DIAGNOSIS — M54.6 ACUTE MIDLINE THORACIC BACK PAIN: ICD-10-CM

## 2020-09-08 PROCEDURE — 99282 EMERGENCY DEPT VISIT SF MDM: CPT | Performed by: FAMILY MEDICINE

## 2020-09-08 PROCEDURE — 99284 EMERGENCY DEPT VISIT MOD MDM: CPT | Mod: Z6 | Performed by: FAMILY MEDICINE

## 2020-09-08 RX ORDER — OXYCODONE HYDROCHLORIDE 5 MG/1
5 TABLET ORAL EVERY 6 HOURS PRN
Qty: 12 TABLET | Refills: 0 | Status: SHIPPED | OUTPATIENT
Start: 2020-09-08

## 2020-09-08 ASSESSMENT — MIFFLIN-ST. JEOR: SCORE: 1560.67

## 2020-09-08 NOTE — ED PROVIDER NOTES
History     Chief Complaint   Patient presents with     Neck Pain     neck pain for past 2 weeks.started after working under unit in boat.      HPI    Dariusz Galeana is a 54 year old male who presents with upper thoracic back pain.  This is been present for about 2-1/2 weeks.  Is been particularly bad the last 4 days.  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  He does not have weakness or numbness in the upper or lower extremities.  The pain will radiate into the right arm and sometimes both arms.  It is aggravated by movement.  He has not had symptoms of fever, cough, sore throat, shortness of breath.  No recent illnesses.  No COVID exposures.  He is taken ibuprofen and Tylenol for the pain with sometimes relief.  He also has some pain in the neck if he moves his neck but the primary painful area is in the upper thoracic spine.  No history of prior back or neck problems.  Past history significant for reflux and allergies.  Medications include pantoprazole and over-the-counter Allegra and steroid nose spray.  No history of injection drug use.    Allergies:  Allergies   Allergen Reactions     Cat Hair Extract Itching     Penicillins Hives       Problem List:    Patient Active Problem List    Diagnosis Date Noted     Chemical dependency (H) 01/18/2017     Priority: Medium        Past Medical History:    Past Medical History:   Diagnosis Date     Depressive disorder        Past Surgical History:    No past surgical history on file.    Family History:    Family History   Problem Relation Age of Onset     Substance Abuse Mother      Substance Abuse Maternal Grandfather        Social History:  Marital Status:   [2]  Social History     Tobacco Use     Smoking status: Never Smoker   Substance Use Topics     Alcohol use: Yes     Comment: Last use date 12/19/16, 5-7 shots daily or traveler last 3 days     Drug use: No        Medications:  "   oxyCODONE (ROXICODONE) 5 MG tablet  fexofenadine (ALLEGRA) 180 MG tablet  fluticasone (FLONASE) 50 MCG/ACT spray  irbesartan (AVAPRO) 150 MG tablet  ondansetron (ZOFRAN) 4 MG tablet  pantoprazole (PROTONIX) 40 MG EC tablet  thiamine 100 MG tablet          Review of Systems    All other systems are reviewed and are negative    Physical Exam   BP: (!) 153/102  Pulse: 67  Temp: 98.4  F (36.9  C)  Resp: 18  Height: 165.1 cm (5' 5\")  Weight: 79.4 kg (175 lb)  SpO2: 98 %      Physical Exam    Nursing note and vitals were reviewed.  Constitutional: Awake and alert, adequately nourished and developed appearing 54-year-old in moderate discomfort, who does not appear acutely ill, and who answers questions appropriately and cooperates with examination.  HEENT: EOMI.   Neck: Neck range of motion is limited by discomfort on flexion but especially on extension and left rotation and left lateral bending.  No tenderness in the midline of the spine.  Pulmonary/Chest: Breathing is unlabored.   Musculoskeletal: He moves all extremities freely.  Neurological: Alert, oriented, thought content logical, coherent.  Motor strength intact in the upper and lower extremities on manual muscle testing.  Psychiatric: Affect broad and appropriate.    ED Course        Procedures               Critical Care time:  none               No results found for this or any previous visit (from the past 24 hour(s)).    Medications - No data to display    Assessments & Plan (with Medical Decision Making)     54-year-old presents with 2 and half weeks of upper thoracic and neck discomfort aggravated by movement after working under a boat in an awkward position.  Symptoms are myofascial and/or discogenic.  No concern for discitis or other more worrisome causes for the pain.  Recommended avoidance of aggravating activities and initiating physical therapy.  Continue acetaminophen and ibuprofen.  May use oxycodone at bedtime for sleep in the short-term but " minimize use.  No indication for advanced imaging or surgical intervention at this time.  Follow-up in primary care if not improved after physical therapy.  Return to the emergency department if new concerning symptoms develop such as weakness or numbness or inability to control the pain or fevers or other concerning symptoms.  He expressed understanding and agreement with the plan and his questions were all answered.    I have reviewed the nursing notes.    I have reviewed the findings, diagnosis, plan and need for follow up with the patient.       New Prescriptions    OXYCODONE (ROXICODONE) 5 MG TABLET    Take 1 tablet (5 mg) by mouth every 6 hours as needed for pain       Final diagnoses:   Acute midline thoracic back pain       9/8/2020   [facility] EMERGENCY DEPARTMENT     Ryan Walker MD  09/08/20 0942

## 2020-09-08 NOTE — DISCHARGE INSTRUCTIONS
Schedule physical therapy.    Avoid heavy lifting, pushing, pulling.    Do gentle neck stretches.    Follow-up in primary care if not mostly better after physical therapy.    Turn to the emergency department if you develop fevers, weakness in the extremities, or other new concerning symptoms.    Use ibuprofen 400 mg up to 4 times per day if needed for pain. Stop if it is causing nausea or abdominal pain.   Add acetaminophen 500 mg 1-2 pills up to every 4 hours if needed for pain.   You may add oxycodone 5 mg, 1-2 tablets up to every 6 hours if needed for pain.  Try to use this primarily only at night to help with sleep.    Do not use alcohol, operate machinery, drive, or climb on ladders for 8 hours after taking oxycodone. Use docusate (100mg) 2 times a day to prevent constipation while on narcotics.

## 2020-09-08 NOTE — ED AVS SNAPSHOT
[facility]  [address] [address]  Phone:  [phone]  Fax:  [phone]                                    Dariusz Galeana   MRN: [id number]    Department:  [facility]   Date of Visit:  9/8/2020           After Visit Summary Signature Page    I have received my discharge instructions, and my questions have been answered. I have discussed any challenges I see with this plan with the nurse or doctor.    ..........................................................................................................................................  Patient/Patient Representative Signature      ..........................................................................................................................................  Patient Representative Print Name and Relationship to Patient    ..................................................               ................................................  Date                                   Time    ..........................................................................................................................................  Reviewed by Signature/Title    ...................................................              ..............................................  Date                                               Time          22EPIC Rev 08/18

## 2020-09-23 ENCOUNTER — HOSPITAL ENCOUNTER (OUTPATIENT)
Dept: PHYSICAL THERAPY | Facility: CLINIC | Age: 55
Setting detail: THERAPIES SERIES
End: 2020-09-23
Attending: FAMILY MEDICINE
Payer: COMMERCIAL

## 2020-09-23 DIAGNOSIS — M54.6 ACUTE MIDLINE THORACIC BACK PAIN: ICD-10-CM

## 2020-09-23 PROCEDURE — 97110 THERAPEUTIC EXERCISES: CPT | Mod: GP | Performed by: PHYSICAL THERAPIST

## 2020-09-23 PROCEDURE — 97140 MANUAL THERAPY 1/> REGIONS: CPT | Mod: GP | Performed by: PHYSICAL THERAPIST

## 2020-09-23 PROCEDURE — 97161 PT EVAL LOW COMPLEX 20 MIN: CPT | Mod: GP | Performed by: PHYSICAL THERAPIST

## 2020-09-23 NOTE — PROGRESS NOTES
Ortho Evaluation  09/23/20 1500   General Information   Type of Visit Initial OP Ortho PT Evaluation   Start of Care Date 09/23/20   Referring Physician Ryan Walker    Patient/Family Goals Statement Strengthen, Sleeping, Drive w/o stiffness. Bike, Swim.    Orders Evaluate and Treat   Date of Order 09/08/20   Certification Required? No   Medical Diagnosis Acute Midline Thoracic Back Pain   Surgical/Medical history reviewed   (Asthma)   Precautions/Limitations no known precautions/limitations   Special Instructions Has been working at home and recently worked on egronomics of work station.    General Information Comments Oxycodone and did Z-pack that finished last monday, Sept 14th. Since then it has gotten better.    Body Part(s)   Body Part(s) Lumbar Spine/SI   Presentation and Etiology   Pertinent history of current problem (include personal factors and/or comorbidities that impact the POC) Towards the end of August, under a boat working on a , trying to crawl into a small space.    Impairments A. Pain;F. Decreased strength and endurance;E. Decreased flexibility   Functional Limitations perform required work activities;perform desired leisure / sports activities   Symptom Location Mid thoracic pain from about T4 to T9 area, and up into neck to base of skull.      How/Where did it occur At home;During recreation/sports   Onset date of current episode/exacerbation 09/08/20  (MD Order date)   Chronicity New   Pain rating (0-10 point scale)   (2-10/10 )   Pain quality A. Sharp;C. Aching   Frequency of pain/symptoms A. Constant  (Worse w/ activity )   Pain/symptoms are: Worse in the morning   Pain/symptoms exacerbated by A. Sitting;C. Lifting;G. Certain positions;H. Overhead reach;L. Work tasks   Pain/symptoms eased by C. Rest;G. Heat   Progression of symptoms since onset: Improved   Current / Previous Interventions   Diagnostic Tests: X-ray   X-ray Results Results   X-ray results Normal, spacing good.     Prior Level of Function   Functional Level Prior Comment Independent w/ ADL's   Current Level of Function   Current Community Support Family/friend caregiver   Patient role/employment history A. Employed  (Liability claimer, mostly computer work. )   Fall Risk Screen   Fall screen completed by PT   Have you fallen 2 or more times in the past year? No   Have you fallen and had an injury in the past year? No   Timed Up and Go score (seconds) No balance issues, walks quickly into dept.    Is patient a fall risk? No   System Outcome Measures   Outcome Measures   (NDI  34 )   Functional Scales   Functional Scales OPTIMAL   Optimal (1=able to do without difficulty, 2=able to do with little difficulty, 3=able to do with moderate difficulty, 4=able to do with much difficulty, 5=unable to do, 9=NA) Activity 1;Activity 2;Activity 3   Activity 1 comment Unable to lift heavy unless conveniently positionsed.    Activity 2 comment Unable to drive long d/t moderate pain/stiffness    Activity 3 comment Sleep disturbed, unable to lay on R side.    Lumbar Spine/SI Objective Findings   Observation No acute distress.    Integumentary Normal    Posture Slight head forward, Flat thoracic back.    Gait/Locomotion Normal    Flexion ROM Neck pain/stretching.    Extension ROM Noraml    Right Side Bending ROM Normal   Left Side Bending ROM Normal    Lumbar/SI Special Tests Comments In prone R Rot'd at T6/7, ERS R T7, Post Rib #3 Left.    Palpation Slight tender paraspinals near T5-7.    Planned Therapy Interventions   Planned Therapy Interventions Comment STM, MT, Jt mobs, MET, S&S, Develop HEP, PTRX# [id number]    Planned Modality Interventions   Planned Modality Interventions Comments Only if indicated.    Clinical Impression   Criteria for Skilled Therapeutic Interventions Met yes, treatment indicated   PT Diagnosis Mechanical Thoracic pain, Mm strain   Influenced by the following impairments Pain, Stiffness, Decreased strength     Functional limitations due to impairments Work duties, Sleep impaired, Driving, HH duties.    Clinical Presentation Evolving/Changing   Clinical Presentation Rationale NDI, Thoracic back dysfunctions,    Clinical Decision Making (Complexity) Low complexity   Therapy Frequency 1 time/week   Predicted Duration of Therapy Intervention (days/wks) 6 weeks, 7 visits.    Risk & Benefits of therapy have been explained Yes   Patient, Family & other staff in agreement with plan of care Yes   Clinical Impression Comments Mechanical Thoracic pain, Mm strain   Education Assessment   Preferred Learning Style Listening;Demonstration;Pictures/video   Barriers to Learning No barriers   ORTHO GOALS   PT Ortho Eval Goals 1;2;3;4   Ortho Goal 1   Goal Identifier 1   Goal Description STG: Pt will be smooth to sleep through the night and on his right side as preferred position.    Target Date 11/04/20   Ortho Goal 2   Goal Identifier 2   Goal Description STG: Pt will be able to drive w/only slight pain/stiffness.    Target Date 11/04/20   Ortho Goal 3   Goal Identifier 3   Goal Description STG: Pt will be able to lift heavy again w/ extra pain.    Target Date 11/04/20   Ortho Goal 4   Goal Identifier 4   Goal Description LTG: Pt will be independent w/HEP and self cares to manage thoracic back pain.    Target Date 11/11/20   Total Evaluation Time   PT [place], Low Complexity Minutes (85804) 25   Sydnee Franco PT, [facility] (#[id number])  [facility]           [phone]  Fax          [phone]  Appt #      [phone]

## 2020-10-01 ENCOUNTER — HOSPITAL ENCOUNTER (OUTPATIENT)
Dept: PHYSICAL THERAPY | Facility: CLINIC | Age: 55
Setting detail: THERAPIES SERIES
End: 2020-10-01
Attending: FAMILY MEDICINE
Payer: COMMERCIAL

## 2020-10-01 PROCEDURE — 97140 MANUAL THERAPY 1/> REGIONS: CPT | Mod: GP | Performed by: PHYSICAL THERAPIST

## 2020-10-01 PROCEDURE — 97110 THERAPEUTIC EXERCISES: CPT | Mod: GP | Performed by: PHYSICAL THERAPIST

## 2020-12-17 ENCOUNTER — RECORDS - HEALTHEAST (OUTPATIENT)
Dept: LAB | Facility: CLINIC | Age: 55
End: 2020-12-17

## 2020-12-17 LAB
ALBUMIN SERPL-MCNC: 4.3 G/DL (ref 3.5–5)
ALP SERPL-CCNC: 77 U/L (ref 45–120)
ALT SERPL W P-5'-P-CCNC: 15 U/L (ref 0–45)
ANION GAP SERPL CALCULATED.3IONS-SCNC: 10 MMOL/L (ref 5–18)
AST SERPL W P-5'-P-CCNC: 19 U/L (ref 0–40)
BILIRUB SERPL-MCNC: 1 MG/DL (ref 0–1)
BUN SERPL-MCNC: 13 MG/DL (ref 8–22)
CALCIUM SERPL-MCNC: 8.9 MG/DL (ref 8.5–10.5)
CHLORIDE BLD-SCNC: 106 MMOL/L (ref 98–107)
CHOLEST SERPL-MCNC: 201 MG/DL
CO2 SERPL-SCNC: 26 MMOL/L (ref 22–31)
CREAT SERPL-MCNC: 0.89 MG/DL (ref 0.7–1.3)
FASTING STATUS PATIENT QL REPORTED: ABNORMAL
GFR SERPL CREATININE-BSD FRML MDRD: >60 ML/MIN/1.73M2
GLUCOSE BLD-MCNC: 85 MG/DL (ref 70–125)
HDLC SERPL-MCNC: 47 MG/DL
LDLC SERPL CALC-MCNC: 129 MG/DL
POTASSIUM BLD-SCNC: 3.9 MMOL/L (ref 3.5–5)
PROT SERPL-MCNC: 6.8 G/DL (ref 6–8)
PSA SERPL-MCNC: 1 NG/ML (ref 0–3.5)
SODIUM SERPL-SCNC: 142 MMOL/L (ref 136–145)
TRIGL SERPL-MCNC: 123 MG/DL

## 2020-12-18 LAB — COVID-19 ANTIBODY IGG: NEGATIVE

## 2021-01-07 NOTE — PROGRESS NOTES
Outpatient Physical Therapy Discharge Note     Patient: Dariusz Galeana  : 1965    Beginning/End Dates of Reporting Period:  20 to 10/1/20 when last seen. Discharge written on 2021.  Total # of Rx sessions: 2    Referring Provider: yRan Walker     Therapy Diagnosis: Acute Midline Thoracic Back Pain      Client Self Report: Better. Still some pain mid thoracic back and up into base of skull.     Objective Measurements:  Objective Measure: NDI   Details: 34     Objective Measure: Thoracic Back alignment   Details: R Rot'd at T6/7, ERS R T7, L Post Rib #3.      Goals:  Goal Identifier 1   Goal Description STG: Pt will be smooth to sleep through the night and on his right side as preferred position.    Target Date 20   Date Met      Progress:     Goal Identifier 2   Goal Description STG: Pt will be able to drive w/only slight pain/stiffness.    Target Date 20   Date Met      Progress:     Goal Identifier 3   Goal Description STG: Pt will be able to lift heavy again w/ extra pain.    Target Date 20   Date Met      Progress:     Goal Identifier 4   Goal Description LTG: Pt will be independent w/HEP and self cares to manage thoracic back pain.    Target Date 20   Date Met      Progress:     Progress Toward Goals:   Not assessed this period.  Seen 2x only.     Plan:  Discharge from therapy.    Discharge:    Reason for Discharge: Patient chooses to discontinue therapy.  Patient has failed to schedule further appointments.    Equipment Issued:      Discharge Plan: Patient to continue home program.  Pt to follow up w/MD as appropriate.   Sydnee Franco, PT, [facility] (#[id number])  [facility]           [phone]  Fax          [phone]  Appt #      [phone]

## 2021-06-01 ENCOUNTER — RECORDS - HEALTHEAST (OUTPATIENT)
Dept: ADMINISTRATIVE | Facility: CLINIC | Age: 56
End: 2021-06-01

## 2021-09-24 ENCOUNTER — HOSPITAL ENCOUNTER (OUTPATIENT)
Dept: MRI IMAGING | Facility: CLINIC | Age: 56
Discharge: HOME | End: 2021-09-24
Attending: STUDENT IN AN ORGANIZED HEALTH CARE EDUCATION/TRAINING PROGRAM | Admitting: STUDENT IN AN ORGANIZED HEALTH CARE EDUCATION/TRAINING PROGRAM
Payer: COMMERCIAL

## 2021-09-24 DIAGNOSIS — M54.9 BACK PAIN: ICD-10-CM

## 2021-09-24 PROCEDURE — 72148 MRI LUMBAR SPINE W/O DYE: CPT

## 2021-12-04 ENCOUNTER — HEALTH MAINTENANCE LETTER (OUTPATIENT)
Age: 56
End: 2021-12-04

## 2022-06-27 ENCOUNTER — LAB REQUISITION (OUTPATIENT)
Dept: LAB | Facility: CLINIC | Age: 57
End: 2022-06-27

## 2022-06-27 DIAGNOSIS — I10 ESSENTIAL (PRIMARY) HYPERTENSION: ICD-10-CM

## 2022-06-27 DIAGNOSIS — Z12.5 ENCOUNTER FOR SCREENING FOR MALIGNANT NEOPLASM OF PROSTATE: ICD-10-CM

## 2022-06-27 DIAGNOSIS — E78.2 MIXED HYPERLIPIDEMIA: ICD-10-CM

## 2022-06-27 LAB
ALBUMIN SERPL-MCNC: 3.8 G/DL (ref 3.5–5)
ALP SERPL-CCNC: 67 U/L (ref 45–120)
ALT SERPL W P-5'-P-CCNC: <9 U/L (ref 0–45)
ANION GAP SERPL CALCULATED.3IONS-SCNC: 8 MMOL/L (ref 5–18)
AST SERPL W P-5'-P-CCNC: 13 U/L (ref 0–40)
BILIRUB SERPL-MCNC: 0.8 MG/DL (ref 0–1)
BUN SERPL-MCNC: 13 MG/DL (ref 8–22)
CALCIUM SERPL-MCNC: 9 MG/DL (ref 8.5–10.5)
CHLORIDE BLD-SCNC: 107 MMOL/L (ref 98–107)
CHOLEST SERPL-MCNC: 211 MG/DL
CO2 SERPL-SCNC: 27 MMOL/L (ref 22–31)
CREAT SERPL-MCNC: 0.85 MG/DL (ref 0.7–1.3)
GFR SERPL CREATININE-BSD FRML MDRD: >90 ML/MIN/1.73M2
GLUCOSE BLD-MCNC: 109 MG/DL (ref 70–125)
HDLC SERPL-MCNC: 50 MG/DL
LDLC SERPL CALC-MCNC: 127 MG/DL
POTASSIUM BLD-SCNC: 3.8 MMOL/L (ref 3.5–5)
PROT SERPL-MCNC: 6.5 G/DL (ref 6–8)
PSA SERPL-MCNC: 1.06 UG/L (ref 0–3.5)
SODIUM SERPL-SCNC: 142 MMOL/L (ref 136–145)
TRIGL SERPL-MCNC: 172 MG/DL

## 2022-06-27 PROCEDURE — 80061 LIPID PANEL: CPT | Performed by: FAMILY MEDICINE

## 2022-06-27 PROCEDURE — 80053 COMPREHEN METABOLIC PANEL: CPT | Performed by: FAMILY MEDICINE

## 2022-06-27 PROCEDURE — G0103 PSA SCREENING: HCPCS | Performed by: FAMILY MEDICINE

## 2022-09-11 ENCOUNTER — HEALTH MAINTENANCE LETTER (OUTPATIENT)
Age: 57
End: 2022-09-11

## 2023-01-23 ENCOUNTER — HEALTH MAINTENANCE LETTER (OUTPATIENT)
Age: 58
End: 2023-01-23

## 2023-10-13 ENCOUNTER — LAB REQUISITION (OUTPATIENT)
Dept: LAB | Facility: CLINIC | Age: 58
End: 2023-10-13

## 2023-10-13 DIAGNOSIS — Z12.5 ENCOUNTER FOR SCREENING FOR MALIGNANT NEOPLASM OF PROSTATE: ICD-10-CM

## 2023-10-13 DIAGNOSIS — E78.2 MIXED HYPERLIPIDEMIA: ICD-10-CM

## 2023-10-13 LAB
ALBUMIN SERPL BCG-MCNC: 4.3 G/DL (ref 3.5–5.2)
ALP SERPL-CCNC: 68 U/L (ref 40–129)
ALT SERPL W P-5'-P-CCNC: 15 U/L (ref 0–70)
ANION GAP SERPL CALCULATED.3IONS-SCNC: 12 MMOL/L (ref 7–15)
AST SERPL W P-5'-P-CCNC: 16 U/L (ref 0–45)
BILIRUB SERPL-MCNC: 0.7 MG/DL
BUN SERPL-MCNC: 19.7 MG/DL (ref 6–20)
CALCIUM SERPL-MCNC: 9.3 MG/DL (ref 8.6–10)
CHLORIDE SERPL-SCNC: 103 MMOL/L (ref 98–107)
CHOLEST SERPL-MCNC: 203 MG/DL
CREAT SERPL-MCNC: 1 MG/DL (ref 0.67–1.17)
DEPRECATED HCO3 PLAS-SCNC: 24 MMOL/L (ref 22–29)
EGFRCR SERPLBLD CKD-EPI 2021: 88 ML/MIN/1.73M2
GLUCOSE SERPL-MCNC: 103 MG/DL (ref 70–99)
HDLC SERPL-MCNC: 44 MG/DL
LDLC SERPL CALC-MCNC: 134 MG/DL
NONHDLC SERPL-MCNC: 159 MG/DL
POTASSIUM SERPL-SCNC: 4.5 MMOL/L (ref 3.4–5.3)
PROT SERPL-MCNC: 6.7 G/DL (ref 6.4–8.3)
PSA SERPL DL<=0.01 NG/ML-MCNC: 1.25 NG/ML (ref 0–3.5)
SODIUM SERPL-SCNC: 139 MMOL/L (ref 135–145)
TRIGL SERPL-MCNC: 124 MG/DL

## 2023-10-13 PROCEDURE — 80061 LIPID PANEL: CPT | Performed by: PHYSICIAN ASSISTANT

## 2023-10-13 PROCEDURE — G0103 PSA SCREENING: HCPCS | Performed by: PHYSICIAN ASSISTANT

## 2023-10-13 PROCEDURE — 80053 COMPREHEN METABOLIC PANEL: CPT | Performed by: PHYSICIAN ASSISTANT

## 2024-02-24 ENCOUNTER — HEALTH MAINTENANCE LETTER (OUTPATIENT)
Age: 59
End: 2024-02-24